# Patient Record
Sex: MALE | Race: BLACK OR AFRICAN AMERICAN | Employment: FULL TIME | URBAN - METROPOLITAN AREA
[De-identification: names, ages, dates, MRNs, and addresses within clinical notes are randomized per-mention and may not be internally consistent; named-entity substitution may affect disease eponyms.]

---

## 2021-01-01 ENCOUNTER — HOSPICE ADMISSION (OUTPATIENT)
Dept: HOSPICE | Facility: HOSPICE | Age: 66
End: 2021-01-01
Payer: MEDICARE

## 2021-01-01 ENCOUNTER — HOSPITAL ENCOUNTER (INPATIENT)
Age: 66
LOS: 3 days | End: 2021-10-18
Attending: INTERNAL MEDICINE | Admitting: INTERNAL MEDICINE

## 2021-01-01 VITALS
TEMPERATURE: 96.9 F | SYSTOLIC BLOOD PRESSURE: 88 MMHG | DIASTOLIC BLOOD PRESSURE: 61 MMHG | RESPIRATION RATE: 20 BRPM | HEART RATE: 112 BPM | BODY MASS INDEX: 27.7 KG/M2 | WEIGHT: 209 LBS | HEIGHT: 73 IN

## 2021-01-01 PROCEDURE — G0299 HHS/HOSPICE OF RN EA 15 MIN: HCPCS

## 2021-01-01 PROCEDURE — 0656 HSPC GENERAL INPATIENT

## 2021-01-01 PROCEDURE — 74011000250 HC RX REV CODE- 250: Performed by: INTERNAL MEDICINE

## 2021-01-01 PROCEDURE — 3336500001 HSPC ELECTION

## 2021-01-01 PROCEDURE — 74011250636 HC RX REV CODE- 250/636: Performed by: INTERNAL MEDICINE

## 2021-01-01 PROCEDURE — 74011250637 HC RX REV CODE- 250/637: Performed by: INTERNAL MEDICINE

## 2021-01-01 RX ORDER — MORPHINE SULFATE 4 MG/ML
4 INJECTION INTRAVENOUS
Status: DISCONTINUED | OUTPATIENT
Start: 2021-01-01 | End: 2021-01-01 | Stop reason: HOSPADM

## 2021-01-01 RX ORDER — SODIUM CHLORIDE 0.9 % (FLUSH) 0.9 %
3 SYRINGE (ML) INJECTION AS NEEDED
Status: DISCONTINUED | OUTPATIENT
Start: 2021-01-01 | End: 2021-01-01 | Stop reason: HOSPADM

## 2021-01-01 RX ORDER — MORPHINE SULFATE 100 MG/5ML
10 SOLUTION ORAL
Status: DISCONTINUED | OUTPATIENT
Start: 2021-01-01 | End: 2021-01-01 | Stop reason: HOSPADM

## 2021-01-01 RX ORDER — LORAZEPAM 2 MG/ML
2 INJECTION INTRAMUSCULAR
Status: DISCONTINUED | OUTPATIENT
Start: 2021-01-01 | End: 2021-01-01 | Stop reason: HOSPADM

## 2021-01-01 RX ORDER — ACETAMINOPHEN 650 MG/1
650 SUPPOSITORY RECTAL
Status: DISCONTINUED | OUTPATIENT
Start: 2021-01-01 | End: 2021-01-01 | Stop reason: HOSPADM

## 2021-01-01 RX ORDER — HYOSCYAMINE SULFATE 0.12 MG/1
0.12 TABLET SUBLINGUAL
Status: DISCONTINUED | OUTPATIENT
Start: 2021-01-01 | End: 2021-01-01 | Stop reason: HOSPADM

## 2021-01-01 RX ORDER — ACETAMINOPHEN 325 MG/1
650 TABLET ORAL
Status: DISCONTINUED | OUTPATIENT
Start: 2021-01-01 | End: 2021-01-01 | Stop reason: HOSPADM

## 2021-01-01 RX ORDER — GLYCOPYRROLATE 0.2 MG/ML
0.2 INJECTION INTRAMUSCULAR; INTRAVENOUS
Status: DISCONTINUED | OUTPATIENT
Start: 2021-01-01 | End: 2021-01-01 | Stop reason: HOSPADM

## 2021-01-01 RX ADMIN — LORAZEPAM 2 MG: 2 INJECTION INTRAMUSCULAR; INTRAVENOUS at 02:17

## 2021-01-01 RX ADMIN — MORPHINE SULFATE 4 MG: 4 INJECTION INTRAVENOUS at 23:29

## 2021-01-01 RX ADMIN — LORAZEPAM 2 MG: 2 INJECTION INTRAMUSCULAR; INTRAVENOUS at 23:23

## 2021-01-01 RX ADMIN — Medication 3 ML: at 00:06

## 2021-01-01 RX ADMIN — MORPHINE SULFATE 4 MG: 4 INJECTION INTRAVENOUS at 00:06

## 2021-01-01 RX ADMIN — MORPHINE SULFATE 4 MG: 4 INJECTION INTRAVENOUS at 02:16

## 2021-01-01 RX ADMIN — MORPHINE SULFATE 4 MG: 4 INJECTION INTRAVENOUS at 23:24

## 2021-01-01 RX ADMIN — ACETAMINOPHEN 650 MG: 650 SUPPOSITORY RECTAL at 05:44

## 2021-01-01 RX ADMIN — Medication 3 ML: at 23:24

## 2021-01-01 RX ADMIN — MORPHINE SULFATE 4 MG: 4 INJECTION INTRAVENOUS at 19:27

## 2021-01-01 RX ADMIN — LORAZEPAM 2 MG: 2 INJECTION INTRAMUSCULAR; INTRAVENOUS at 10:39

## 2021-01-01 RX ADMIN — MORPHINE SULFATE 4 MG: 4 INJECTION INTRAVENOUS at 17:32

## 2021-01-01 RX ADMIN — Medication 3 ML: at 02:16

## 2021-01-01 RX ADMIN — Medication 3 ML: at 23:29

## 2021-01-01 RX ADMIN — ACETAMINOPHEN 650 MG: 650 SUPPOSITORY RECTAL at 17:32

## 2021-01-01 RX ADMIN — Medication 3 ML: at 10:32

## 2021-01-01 RX ADMIN — ACETAMINOPHEN 650 MG: 650 SUPPOSITORY RECTAL at 00:05

## 2021-01-01 RX ADMIN — Medication 3 ML: at 19:27

## 2021-01-01 RX ADMIN — MORPHINE SULFATE 4 MG: 4 INJECTION INTRAVENOUS at 10:32

## 2021-01-01 RX ADMIN — LORAZEPAM 2 MG: 2 INJECTION INTRAMUSCULAR; INTRAVENOUS at 23:28

## 2021-10-15 PROBLEM — J96.91 RESPIRATORY FAILURE WITH HYPOXIA AND HYPERCAPNIA (HCC): Status: ACTIVE | Noted: 2021-01-01

## 2021-10-15 PROBLEM — J96.91 RESPIRATORY FAILURE WITH HYPOXIA (HCC): Status: ACTIVE | Noted: 2021-01-01

## 2021-10-15 PROBLEM — F02.818 ALZHEIMER'S DEMENTIA WITH BEHAVIORAL DISTURBANCE (HCC): Status: ACTIVE | Noted: 2021-01-01

## 2021-10-15 PROBLEM — T17.900A PULMONARY ASPIRATION: Status: ACTIVE | Noted: 2021-01-01

## 2021-10-15 PROBLEM — G30.9 ALZHEIMER'S DEMENTIA WITH BEHAVIORAL DISTURBANCE (HCC): Status: ACTIVE | Noted: 2021-01-01

## 2021-10-15 PROBLEM — J96.92 RESPIRATORY FAILURE WITH HYPOXIA AND HYPERCAPNIA (HCC): Status: ACTIVE | Noted: 2021-01-01

## 2021-10-15 NOTE — PROGRESS NOTES
Mr Ran Gonzalez is a 77year old male admitted to room 115 for hospice diagnosis of hypoxic respiratory failure. Pt was non responsive with transfer but had moist cough with respirations 40/minute and temp of 102.9 axillary. Administered morphine IVP for dyspnea and tylenol TN for elevated temp. Pt incontinent of dipak urine. Incontinent care completed.      Report given to Ofelia Gil RN
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
## 2021-10-16 NOTE — PROGRESS NOTES
Problem: Falls - Risk of  Goal: *Absence of Falls  Description: Document John Domingo Fall Risk and appropriate interventions in the flowsheet. 10/16/2021 0755 by Katie Richards RN  Outcome: Progressing Towards Goal  Note: Fall Risk Interventions:            Medication Interventions: Bed/chair exit alarm, Evaluate medications/consider consulting pharmacy    Elimination Interventions: Bed/chair exit alarm           10/16/2021 0754 by Katie Richards RN  Outcome: Progressing Towards Goal  Note: Fall Risk Interventions:            Medication Interventions: Bed/chair exit alarm, Evaluate medications/consider consulting pharmacy    Elimination Interventions: Bed/chair exit alarm              Problem: Patient Education: Go to Patient Education Activity  Goal: Patient/Family Education  Outcome: Resolved/Met     Problem: Patient Education: Go to Patient Education Activity  Goal: Patient/Family Education  Outcome: Resolved/Met     Problem: Hospice Orientation  Goal: Demonstrate understanding of hospice philosophy, plan of care, and home hospice program  Description: The patient/family/caregiver will demonstrate understanding of hospice philosophy, plan of care and the home hospice program as evidenced by participation in meeting the patient's psychosocial, spiritual, medical, and physical needs inclusive of medical supplies/equipment focusing on symptoms. Outcome: Resolved/Met     Problem: Pain  Goal: Assess satisfaction of level of comfort and symptom control  Outcome: Progressing Towards Goal  Goal: *Control of acute pain  Outcome: Progressing Towards Goal     Problem: Anxiety/Agitation  Goal: Verbalize or staff assess the ability to manage anxiety  Description: The patient/family/caregiver will verbalize and demonstrate ability to manage the patient's anxiety throughout hospice care.   Outcome: Progressing Towards Goal     Problem: Pressure Injury - Risk of  Goal: *Prevention of pressure injury  Outcome: Progressing Towards Goal     Problem: Dyspnea Due to End of Life  Goal: Demonstrate understanding of and ability to manage respiratory symptoms at end of life  Outcome: Progressing Towards Goal
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
## 2021-10-16 NOTE — PROGRESS NOTES
Problem: Falls - Risk of  Goal: *Absence of Falls  Description: Document Noel Falcon Fall Risk and appropriate interventions in the flowsheet. Outcome: Progressing Towards Goal  Note: Fall Risk Interventions:            Medication Interventions: Bed/chair exit alarm, Evaluate medications/consider consulting pharmacy    Elimination Interventions: Bed/chair exit alarm              Problem: Patient Education: Go to Patient Education Activity  Goal: Patient/Family Education  Outcome: Progressing Towards Goal     Problem: Hospice Orientation  Goal: Demonstrate understanding of hospice philosophy, plan of care, and home hospice program  Description: The patient/family/caregiver will demonstrate understanding of hospice philosophy, plan of care and the home hospice program as evidenced by participation in meeting the patient's psychosocial, spiritual, medical, and physical needs inclusive of medical supplies/equipment focusing on symptoms. Outcome: Progressing Towards Goal     Problem: Potential for Alteration in Skin Integrity  Goal: Monitor skin for areas of alteration in skin integrity  Description: Patient/family/caregiver will demonstrate ability to care for patient's skin, monitor for areas of breakdown, and demonstrate methods to prevent breakdown during hospice care. Outcome: Progressing Towards Goal     Problem: Risk for Falls  Goal: Free of falls during inpatient stay  Description: Patient will be free of falls during inpatient stay. Outcome: Progressing Towards Goal     Problem: Alteration in Mobility  Goal: Remain as independent as possible and remain safe in environment  Description: Patient will remain as independent as possible and remain safe in their environment.   Outcome: Progressing Towards Goal     Problem: Pain  Goal: Assess satisfaction of level of comfort and symptom control  Outcome: Progressing Towards Goal  Goal: *Control of acute pain  Outcome: Progressing Towards Goal     Problem: Anxiety/Agitation  Goal: Verbalize or staff assess the ability to manage anxiety  Description: The patient/family/caregiver will verbalize and demonstrate ability to manage the patient's anxiety throughout hospice care. Outcome: Progressing Towards Goal     Problem: Breathing Pattern - Ineffective  Goal: *Use of effective breathing techniques  Outcome: Progressing Towards Goal     Problem: Pressure Injury - Risk of  Goal: *Prevention of pressure injury  Outcome: Progressing Towards Goal     Problem: General Wound Care  Goal: *Non-infected wound: Improvement of existing wound, absence of infection, and maintenance of skin integrity  Outcome: Progressing Towards Goal  Goal: Interventions  Outcome: Progressing Towards Goal     Problem: End of Life Process  Goal: Demonstrate understanding of end of life processes  Description: Patient/caregiver will understand end of life processes.   Outcome: Progressing Towards Goal     Problem: Dyspnea Due to End of Life  Goal: Demonstrate understanding of and ability to manage respiratory symptoms at end of life  Outcome: Progressing Towards Goal     Problem: Imminent Death  Goal: Collaborate with patient/family/caregiver/interdisciplinary team to minimize and manage end of life symptoms  Outcome: Progressing Towards Goal

## 2021-10-16 NOTE — PROGRESS NOTES
[address] Report taken from Emmy Homans, Pt identified. Pt in bed with eyes closed; displaying no signs or symptoms of pain, dyspnea, agitation,seizures, nausea, or vomiting. FLACC 0. Bed locked and low, side rails up, tabs/bed alarm in place for pt. safety. Call light with in reach, and door opened for continued monitoring. Wife at beside, she was in restroom getting dresses. She did ask for ice for her insulin, which was provided. Care plan reviewed with Cna.     6006 Pt admitted GIP level of care with a hospice diagnosis of respiratory failure with hypoxia and hypercapnia. He is here for symptom control of pain and dyspnea. Patient also has multiple co-morbdities including, alzheimer's disease, medication noncompliance, Hx of previous DVT, acute pulmonary aspiration, diabetes type 2, and discoid lupus. Discharge Plan is to remain at [facility] until he meets his demise. Reassessment of proper LOC will be done on an ongoing basis. 0840 wife at bedside eating. Pt observed and appears to be sleeping/resting, no facial grimacing, no moaning, easy relaxed respirations. No symptoms to manage at this time. Flacc 0/10    1010 Pt observed and appears to be sleeping/resting, no facial grimacing, no moaning, easy relaxed respirations. No symptoms to manage at this time. Flacc 0/10    1210 Pt observed and appears to be sleeping/resting, no facial grimacing, no moaning, easy relaxed respirations. No symptoms to manage at this time. Flacc 0/10.    1410 Pt observed and appears to be sleeping/resting, no facial grimacing, no moaning, easy relaxed respirations. No symptoms to manage at this time. Flacc 0/10    1620 Pt repositioned, michael care, and mouth care. ,Pt observed and appears to be sleeping/resting, no facial grimacing, no moaning, easy relaxed respirations. No symptoms to manage at this time. Flacc 0/10. Wife had some questions about end of care and signs/symptoms.      1805 Pt observed and appears to be sleeping/resting, no facial grimacing, no moaning, easy relaxed respirations. No symptoms to manage at this time. Flacc 0/10. Wife and son at bedside.  Report to be given to Akanksha Daniel
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
## 2021-10-17 NOTE — PROGRESS NOTES
1845:  Patient report from Moo Bardales RN. Patient ID by name and . Patient is GIP LOC for hospice diagnosis of hypoxic respiratory failure to manage symptoms of dyspnea and agitation. Discharge plan is for patient to remain in [facility] until demise. Discharge plans to be evaluated for potential LOC change. RN reviewed POC with CNA. Patient currently resting with no symptoms to manage. Wife at bedside. Bed low and locked and all safety measures in place. Door is closed. 2020:  Patient repositioned for comfort and physical assessment complete. Wife at bedside. Patient requiring no symptom management at this time. FLACC 0/10. All safety measures in place. 2225:  Spouse out to nursing station. States patient feels warm. Cool cloth provided and blanket pulled down. Education on end of life changes provided to spouse. Patient is resting with no symptoms to manage. FLACC 0/10.    0006:  Temp taken; 101.2 axillary. PRN Tylenol suppository given. PRN Morphine 4 mg IV given for dyspnea AEB use of accessory muscles. Patient repositioned for comfort. Will reassess. 0045:  Reassessment:  Patient with less labored breathing noted. Patient's facial features relaxed. FLACC 0/10. Wife remains at bedside. 0300:  Patient resting with no symptoms to manage at this time. FLACC 0/10. Wife remains at bedside. 0408:  Patient with no heartbeat or respirations present. Wife at bedside notified. 46:   called as patient is a 's case.

## 2021-10-17 NOTE — PROGRESS NOTES
[address] received from Malachi Perry RN. Patient identified by name and . Patient admitted under [facility] level of care with diagnosis of hypoxic respiratory failure for management of dyspnea, agitation, and pain. Patient is lying in bed with eyes closed in no acute distress noted. No signs or symptoms of pain, anxiety, agitation, dyspnea, or nausea/vomiting. Patient is currently on 3L of O2 via NC. Patient is currently wearing a brief and needs incontinent care throughout the day and night. Patient haw multiple family members at his bedside visiting. FLAAC 1/10. Patient repositioned for comfort. RN providing total care this shift. Discharge Plan is to remain at [facility] until he meets his demise. Reassessment of proper LOC will be done on an ongoing basis. Safety measures in place including, door open for continuous monitoring, bed in low locked position, tab alert in place, call light within reach, and side rails up x2. Will continue to monitor for changes, safety, and needs.  Patient continues to rest in bed with no acute distress noted. His respirations have decreased since this morning and do not appear as labored. Patient without facial grimacing no moaning/crying out in pain. Patient appears comfortable and is without symptoms to manage at this time. FLACC 010. Safety measures remain in place, will continue to monitor for changes, safety, and comfort.  Patient lying in bed with eyes closed and respirations even and slightly labored. He appears to be in no acute distress at this time. Wife remains at his bedside providing comfort and denies any current needs. FLACC 2/10. Safety measures remain in place, will continue to monitor for changes, safety, and comfort. [address] catheter using sterile technique with clear dark yellow urine, patient tolerated well. Patient turned and repositioned following cleaning up the patient and placing a new brief on.  Patient's wife remained at his side during the procedure. Patient medicated with Ativan 2mg IVP and Morphine 4mg IVP per prn order for anxiety/agitation and pain. FLACC 4/10. Safety measures remain in place, will continue to monitor for changes, safety, and comfort. 0005 Patient appears to be resting quietly without acute distress following his medications being given. Wife and son remain at his bedside at this time, both deny any current needs. 0208 Patient continues to rest in bed with eyes closed and respirations remain unchanged. His son is now at his bedside with the patient's wife both providing support to the patient. Both deny any current needs at this time. FLACC 2/10. Safety measures remain in place, will continue to monitor for changes, safety, and comfort.    0401 Patient lying in bed with eyes closed and respirations even and slightly labored. Patient does not appear to be in any acute distress at this time. His wife and son remain at his bedside providing support. No needs voiced at this time and no symptoms to address. FLACC 0/10. Safety measures remain in place, will continue to monitor for changes, safety, and comfort.    0600 Patient lying in bed with eyes closed in no acute distress noted. Respirations remain unchanged from previous assessment. FLACC 0/10. Safety measures remain in place, will continue to monitor for changes, safety, and comfort.     Report given to Lilly Arguello RN

## 2021-10-17 NOTE — PROGRESS NOTES
Problem: Falls - Risk of  Goal: *Absence of Falls  Description: Document Jameel Madrigal Fall Risk and appropriate interventions in the flowsheet. Outcome: Progressing Towards Goal  Note: Fall Risk Interventions:            Medication Interventions: Bed/chair exit alarm, Evaluate medications/consider consulting pharmacy    Elimination Interventions: Bed/chair exit alarm, Toileting schedule/hourly rounds              Problem: Pain  Goal: Assess satisfaction of level of comfort and symptom control  Outcome: Progressing Towards Goal     Problem: Anxiety/Agitation  Goal: Verbalize or staff assess the ability to manage anxiety  Description: The patient/family/caregiver will verbalize and demonstrate ability to manage the patient's anxiety throughout hospice care.   Outcome: Progressing Towards Goal     Problem: Pressure Injury - Risk of  Goal: *Prevention of pressure injury  Outcome: Progressing Towards Goal     Problem: Dyspnea Due to End of Life  Goal: Demonstrate understanding of and ability to manage respiratory symptoms at end of life  Outcome: Progressing Towards Goal     Problem: Infection - Risk of, Urinary Catheter-Associated Urinary Tract Infection  Goal: *Absence of infection signs and symptoms  Outcome: Progressing Towards Goal

## 2021-10-17 NOTE — PROGRESS NOTES
Problem: Falls - Risk of  Goal: *Absence of Falls  Description: Document Anaya Pond Fall Risk and appropriate interventions in the flowsheet. Outcome: Progressing Towards Goal  Note: Fall Risk Interventions:            Medication Interventions: Bed/chair exit alarm, Evaluate medications/consider consulting pharmacy    Elimination Interventions: Bed/chair exit alarm              Problem: Pain  Goal: Assess satisfaction of level of comfort and symptom control  Outcome: Progressing Towards Goal  Goal: *Control of acute pain  Outcome: Progressing Towards Goal     Problem: Anxiety/Agitation  Goal: Verbalize or staff assess the ability to manage anxiety  Description: The patient/family/caregiver will verbalize and demonstrate ability to manage the patient's anxiety throughout hospice care. Outcome: Progressing Towards Goal     Problem: Pressure Injury - Risk of  Goal: *Prevention of pressure injury  Outcome: Progressing Towards Goal     Problem: General Wound Care  Goal: *Non-infected wound: Improvement of existing wound, absence of infection, and maintenance of skin integrity  Outcome: Progressing Towards Goal  Goal: Interventions  Outcome: Progressing Towards Goal     Problem: End of Life Process  Goal: Demonstrate understanding of end of life processes  Description: Patient/caregiver will understand end of life processes.   Outcome: Progressing Towards Goal     Problem: Dyspnea Due to End of Life  Goal: Demonstrate understanding of and ability to manage respiratory symptoms at end of life  Outcome: Progressing Towards Goal     Problem: Imminent Death  Goal: Collaborate with patient/family/caregiver/interdisciplinary team to minimize and manage end of life symptoms  Outcome: Progressing Towards Goal

## 2021-10-17 NOTE — PROGRESS NOTES
0730- Report received, patient resting quietly in bed with no s/sx pain, dyspnea, agitation, or distress. 02 on at 3L. Call light within reach. Bed is low and locked, sr up x 3, tab alert in place, and door left closed with spouse and son at bedside. Patient is currently under [facility] level of care appropriately, and expected to pass at [facility]. He is a 's case. Will continue to assess need for change in LOC / discharge and collaborate with IDG team accordingly. Care plan reviewed with CNA. 0830- no acute changes upon observation. No needs noted from wife. 1032- prn morphine given for grimacing with turning. flacc 4/10.    1039- prn ativan given prior to bath for agitation. 1115- face and body relaxed, breathing more shallow, flacc 0/10. Skin warm to touch. No needs noted at this time. No family present in room. Door remains open with tab alert in place. 1305- no s/sx of pain or distress. Wife at bedside. 1540- no s/sx of pain or distress. Feed cold to touch. Knees mottled and face pale. Spouse updated that demise is near. Likely hours to days. 1710- no s/sx of pain or distress. 1830- no s/sx of pain or distress.     Report given to Dahlia Salguero